# Patient Record
Sex: MALE
[De-identification: names, ages, dates, MRNs, and addresses within clinical notes are randomized per-mention and may not be internally consistent; named-entity substitution may affect disease eponyms.]

---

## 2017-07-16 NOTE — NUR
ADMISSION RN NOTE:

ADMITTED THIS 75 Y/O MALE. PT ADMITTED FROM [facility]. PT IS ON 5150 
FOR GD. PSYCH DX OF PSYCHOSIS. MEDICAL DX OF HTN, DM. PER HOLD PATIENT IS RAMBLING ABOUT 
ALLL THE MONEY THE GOVERNMENT OWES HIM AND STATED THAT HE IS BROKE. PT UNABLE TO FORMULATE 
WHERE HE HAS BEEN STAYING OR WHERE HE IS GOING TO STAY. RAPID, TANGENTIAL, DELUSIONAL SPEECH 
AND UNABLE TO PROVIDE ACCESS SHELTER FOR HIMSELF. UPON FACE TO FACE PT IS ALERT & ORIENTED 
X1-2, CONFUSED, DISORIENTED, DISORGANIZED. RESPIRATION EVEN, BREATHING AND UNLABORED. NO 
APPARENT DISTRESS NOTED. NO COMPLAIN OF PAIN AT THIS TIME. V/S WNL. MRSA DONE. SKIN BODY 
ASSESSMENT DONE. NOTED BUTTOCKS REDNESS, RASH. LEFT KNEE, BOTH HIP, BOTH ARM SCAB. BOTH FOOT 
REDNESS. LEFT AND RIGHT LOWER LEG SCAB. FOREHEAD REDNESS. WOUND CONSULT TRIGGERED. 
BELONGINGS INVENTORIED AND CHECKED FOR CONTRABAND. VALUABLES WERE PUT TO SAFE. BED LOCKED 
AND PLACED ON LOWEST POSITION. SIDERAILS UPX2. ALL NEEDS ATTENDED AND ANTICIPATED. WILL 
CONTINUE TO MONITOR Q15 MINS FOR SAFETY AND BEHAVIOR.

## 2017-07-18 NOTE — NUR
Initial discharge plan ;Pt is a resident at [facility] [id number] [phone] but states he doesn't have anywhere to go and wants to stay and 
be placed in [place]. SW will follow up with Md and facility and will arrange for safe 
and proper discharge.

## 2017-07-18 NOTE — NUR
Pt. was referred to to [facility] [address], [place] 
[address]  [phone]. 

-------------------------------------------------------------------------------

Addendum: 07/18/17 at 1521 by CHIVO MADSEN

-------------------------------------------------------------------------------

Per April from the facility pt. is accepted but they need to work on opening up a bed.

## 2017-07-18 NOTE — NUR
CALE spoke with Jian from [facility]  [address] [id number] [phone] who said pt. cannot be accepted back due to him leaving the facility again. CALE 
will look for a new placement.

## 2017-07-19 NOTE — NUR
Pt. was referred and accepted to [facility] [address], [place] [address] 
Phone: [phone] per Eva from admission. Dr. Tapia wanted this facility as pt. 
needs a long term facility.

## 2017-07-19 NOTE — NUR
WOUND CARE CONSULT: PT IS AMBULATORY AND CONTINENT. RESOLVING RASH NOTED TO LOWER BUTTOCKS. 
RECOMMEND Z GUARD AS NEEDED. SKIN TO BE KEPT CLEAN AND DRY. DISCUSSED WITH NURSING STAFF. 
WILL SEE PRN.

## 2017-07-20 NOTE — NUR
GPS RN NOTE,  RECEIVED PATIENT AWAKE AND IN BED, NO S/S OR COMPLAINTS OF PAIN AT THIS TIME.  
PATIENT IS DISPLAYING NO S/S OF APPARENT DISTRESS AT THIS TIME.   PATIENT BREATHING IS 
UNLABORED WITH EQUAL RISE AND FALL OF THE CHEST.  PATIENT IS ALERT AND ORIENTED X 2 ON ROOM 
AIR WITH A SPO2 95%.  PATIENT COMPLAINT WITH MEDICATION, ANXIOUS, COOPERATIVE, CALM, POLITE, 
AND NEEDS REORIENTATION.  PATIENT DENIES SUICIDE AND HOMICIDAL IDEATIONS AT THIS TIME.  
PATIENT ASSISTED WITH TURNING AND REPOSITIONING Q2HR AND PRN FOR COMFORT AND CIRCULATION.  
PATIENT HAS NO NEEDS AT THIS TIME.  PATIENT EDUCATED ON THE USE OF THE CALL BELL.  PATIENT 
BED SIDE RAILS UP X2 FOR SAFETY, BED IS LOCKED AND LOW WILL CONTINUE TO MONITOR AND MAINTAIN 
SAFETY.

## 2017-07-20 NOTE — NUR
GPS RN NOTE, PERFORMED ACCU-CHECK ON PATIENT WITH A BLOOD SUGAR RESULT .  GAVE 4 UNITS 
OF REGULAR INSULIN PER SLIDING SCALE.  WILL CONTINUE TO MONITOR THIS PATIENT.

## 2017-07-21 NOTE — NUR
Patient informed Jose from [facility] [address], [place] [address] 
Phone: [phone] Fax: [phone] that patient will be discharging there on Monday. SW 
faxed him vaccine information and will follow up with facility if discharge date changes. No 
family to inform. CALE will schedule [facility] transportation for patient.

## 2017-07-21 NOTE — NUR
GPS RN NOTE:

PATIENT REPORTED IMPROVED GENERALIZED PAIN. ALL NEEDS MET AND ANTICIPATED. WILL CONTINUE TO 
MONITOR.

## 2017-07-21 NOTE — NUR
GPS/RN NOTE:



ACCUCHECK 217 MG/DL, 4 UNITS REG. INSULIN SC ADMINISTERED, OFFERED HS SNACKS, REFUSED, SAID 
HE IS FULL.

## 2017-07-21 NOTE — NUR
GPS RN NOTE:

PATIENT BLOOD SUGAR 257 MG/DL. 6 UNITS OF INSULIN GIVEN PER SLIDING SCALE. WILL CONTINUE TO 
MONITOR.

## 2017-07-21 NOTE — NUR
GPS RN NOTE, PATIENT HAS A COMPLAINT OF LEFT KNEE PAIN AT 3 OUT 10 ON THE PAIN SCALE AND 
WOULD LIKE MEDICATION AT THIS TIME.  PATIENT VITAL SIGNS ARE STABLE.  GAVE TYLENOL 650MG PO 
Q6HR PRN.  WILL REASSESS PAIN AND I WILL CONTINUE TO MONITOR THIS PATIENT.

## 2017-07-21 NOTE — NUR
GPS RN NOTE:

RECEIVED PATIENT SITTING ON BED. PATIENT IS CALM, COOPERATIVE AND TALKATIVE. ALERT AND 
ORIENTED. DENIES PAIN. DENIES SI/HI. ALL NEEDS MET AND ANTICIPATED. WILL CONTINUE TO MONITOR 
FOR SAFETY AND BEHAVIOR.

## 2017-07-23 NOTE — NUR
GPS/RN NOTE:





AWAKE, AMBULATING AROUND THE UNIT TO THE DINING AREA AND BACK IN HIS ROOM. CALM, PLEASANT 
AND COOPERATIVE.

## 2017-07-23 NOTE — NUR
GPS-RN-NOTES:

BLOOD SUGAR  MG/DL AND GAVE 4 UNITS OF REGULAR INSULIN 

-------------------------------------------------------------------------------

Addendum: 07/23/17 at 1432 by DIEGO MINA RN

-------------------------------------------------------------------------------

INCORRECT INSULIN COVERAGE IS: GAVE 3 UNITS OF REGULAR INSULIN

## 2017-07-24 NOTE — NUR
GPS/RN

PATIENT IS SLEEPING AT THIS TIME, CALM AND COMFORTABLE, BREATHING EVEN AND UNLABORED, WILL 
CONTINUE TO MONITOR.

## 2017-07-25 NOTE — NUR
GPS-RN-NOTES:

PT IS 76 YEARS OLD MALE DISCHARGE TO [facility] [address] [place], [place]. 
[address] [phone] IN STABLE CONDITION. COMPLAINT WITH MEDICATIONS, COOPERATIVE WITH 
TREATMENT PLANS. PT DENIES SI/HI AND INSTRUCTED TO GO THE CLOSEST ER IF DEVELOPING SI/HI. 
BEHAVIOR IMPROVED, PSYCHIATRIC TX PLANS MET, MEDICAL TX PLANS DEFERRED FOR CONTINUAL 
MONITORING. EDUCATED PT ABOUT AFTER CARE PLAN AND COPY PROVIDED. RETURNED PERSONAL 
BELONGINGS TO PT. MEDICATIONS RECONCILED WITH DR. DOUGLAS AND DR. CONTEH. PT IS GOING TO 
BE FOLLOWED BY DR. DUOGLAS AT [address] [place]. [address] [phone]. REPORT GIVEN TO LASHAWN IN [facility] FOR CONTINUITY OF CARE. PT SIGNED 
DISCHARGE PAPERWORK. SKIN ASSESSMENT DONE. PT LEFT THE UNIT BY AMBULANCE STAFF [facility] [address]. [address], [facility], [place]. [address] [phone].

## 2017-07-25 NOTE — NUR
Discharge note:  Pt. will discharge to [facility] [address], [place] 
[address] Phone: [phone] via [facility] ambulance at 1:00PM.  No family to be notified . 
Pt. is alert and oriented and agrees with the discharge plan. Pt. denies suicidal/homicidal 
ideations, hallucinations. Pt. is calm and cooperative. Pt. will follow up with Dr. Tapia 
at the facility. Discharge continuing care form has been signed and discharge instructions 
(report) will be provided to the facility prior to transfer.

## 2017-07-25 NOTE — NUR
GPS/RN

STILL SLEEPING AT THIS TIME, COMFORTABLE, NO CHANGE IS CONDITION. ALL NEEDS ATTENDED AT THIS 
TIME. WILL CONTINUE TO MONITOR.

## 2020-08-21 ENCOUNTER — HOSPITAL ENCOUNTER (INPATIENT)
Dept: HOSPITAL 12 - ER | Age: 80
LOS: 11 days | Discharge: NURSING, REHAB OR LONG-TERM CARE | DRG: 885 | End: 2020-09-01
Payer: MEDICARE

## 2020-08-21 VITALS — WEIGHT: 232 LBS | HEIGHT: 69 IN | BODY MASS INDEX: 34.36 KG/M2

## 2020-08-21 DIAGNOSIS — F17.210: ICD-10-CM

## 2020-08-21 DIAGNOSIS — I10: ICD-10-CM

## 2020-08-21 DIAGNOSIS — D75.89: ICD-10-CM

## 2020-08-21 DIAGNOSIS — E66.9: ICD-10-CM

## 2020-08-21 DIAGNOSIS — D68.69: ICD-10-CM

## 2020-08-21 DIAGNOSIS — E44.0: ICD-10-CM

## 2020-08-21 DIAGNOSIS — E11.65: ICD-10-CM

## 2020-08-21 DIAGNOSIS — B35.1: ICD-10-CM

## 2020-08-21 DIAGNOSIS — F29: ICD-10-CM

## 2020-08-21 DIAGNOSIS — F03.90: ICD-10-CM

## 2020-08-21 DIAGNOSIS — Z79.84: ICD-10-CM

## 2020-08-21 DIAGNOSIS — L85.3: ICD-10-CM

## 2020-08-21 DIAGNOSIS — F25.0: Primary | ICD-10-CM

## 2020-08-21 DIAGNOSIS — K21.9: ICD-10-CM

## 2020-08-21 DIAGNOSIS — Z59.0: ICD-10-CM

## 2020-08-21 DIAGNOSIS — G89.29: ICD-10-CM

## 2020-08-21 DIAGNOSIS — M19.90: ICD-10-CM

## 2020-08-21 PROCEDURE — A4663 DIALYSIS BLOOD PRESSURE CUFF: HCPCS

## 2020-08-21 SDOH — ECONOMIC STABILITY - HOUSING INSECURITY: HOMELESSNESS: Z59.0

## 2020-08-21 NOTE — NUR
Patient BIB private ambuance from [facility] A/Ox3, 
ambulatory with assistance. No distress noted. Here for medical clearance to be 
eval by Crisis team.

## 2020-08-22 VITALS — SYSTOLIC BLOOD PRESSURE: 132 MMHG | DIASTOLIC BLOOD PRESSURE: 69 MMHG

## 2020-08-22 VITALS — SYSTOLIC BLOOD PRESSURE: 117 MMHG | DIASTOLIC BLOOD PRESSURE: 55 MMHG

## 2020-08-22 VITALS — DIASTOLIC BLOOD PRESSURE: 48 MMHG | SYSTOLIC BLOOD PRESSURE: 113 MMHG

## 2020-08-22 VITALS — DIASTOLIC BLOOD PRESSURE: 66 MMHG | SYSTOLIC BLOOD PRESSURE: 101 MMHG

## 2020-08-22 LAB
ALP SERPL-CCNC: 88 U/L (ref 50–136)
ALT SERPL W/O P-5'-P-CCNC: 32 U/L (ref 16–63)
AST SERPL-CCNC: 16 U/L (ref 15–37)
BASOPHILS # BLD AUTO: 0.1 K/UL (ref 0–8)
BASOPHILS NFR BLD AUTO: 1 % (ref 0–2)
BILIRUB SERPL-MCNC: 0.5 MG/DL (ref 0.2–1)
BUN SERPL-MCNC: 18 MG/DL (ref 7–18)
CHLORIDE SERPL-SCNC: 103 MMOL/L (ref 98–107)
CHOLEST SERPL-MCNC: 143 MG/DL (ref ?–200)
CO2 SERPL-SCNC: 22 MMOL/L (ref 21–32)
CREAT SERPL-MCNC: 1.2 MG/DL (ref 0.6–1.3)
EOSINOPHIL # BLD AUTO: 0.3 K/UL (ref 0–0.7)
EOSINOPHIL NFR BLD AUTO: 3.8 % (ref 0–7)
GLUCOSE SERPL-MCNC: 148 MG/DL (ref 74–106)
HCT VFR BLD AUTO: 37 % (ref 36.7–47.1)
HDLC SERPL-MCNC: 49 MG/DL (ref 40–60)
HGB BLD-MCNC: 12.7 G/DL (ref 12.5–16.3)
LYMPHOCYTES # BLD AUTO: 2 K/UL (ref 20–40)
LYMPHOCYTES NFR BLD AUTO: 23.3 % (ref 20.5–51.5)
MCH RBC QN AUTO: 33.9 UUG (ref 23.8–33.4)
MCHC RBC AUTO-ENTMCNC: 34 G/DL (ref 32.5–36.3)
MCV RBC AUTO: 99.2 FL (ref 73–96.2)
MONOCYTES # BLD AUTO: 0.6 K/UL (ref 2–10)
MONOCYTES NFR BLD AUTO: 6.8 % (ref 0–11)
NEUTROPHILS # BLD AUTO: 5.7 K/UL (ref 1.8–8.9)
NEUTROPHILS NFR BLD AUTO: 65.1 % (ref 38.5–71.5)
PLATELET # BLD AUTO: 334 K/UL (ref 152–348)
POTASSIUM SERPL-SCNC: 4.5 MMOL/L (ref 3.5–5.1)
RBC # BLD AUTO: 3.73 MIL/UL (ref 4.06–5.63)
TRIGL SERPL-MCNC: 104 MG/DL (ref 30–150)
WBC # BLD AUTO: 8.8 K/UL (ref 3.6–10.2)
WS STN SPEC: 7 G/DL (ref 6.4–8.2)

## 2020-08-22 RX ADMIN — OLANZAPINE SCH MG: 5 TABLET ORAL at 14:54

## 2020-08-22 RX ADMIN — OLANZAPINE SCH MG: 5 TABLET ORAL at 20:10

## 2020-08-22 RX ADMIN — SODIUM CHLORIDE PRN UNIT: 9 INJECTION, SOLUTION INTRAVENOUS at 17:14

## 2020-08-22 RX ADMIN — NICOTINE SCH MG: 21 PATCH, EXTENDED RELEASE TOPICAL at 08:50

## 2020-08-22 RX ADMIN — DIVALPROEX SODIUM SCH MG: 250 TABLET, DELAYED RELEASE ORAL at 17:13

## 2020-08-22 RX ADMIN — Medication SCH EACH: at 16:35

## 2020-08-22 RX ADMIN — SODIUM CHLORIDE PRN UNIT: 9 INJECTION, SOLUTION INTRAVENOUS at 20:11

## 2020-08-22 RX ADMIN — Medication SCH EACH: at 20:10

## 2020-08-22 NOTE — NUR
Admitting Note GPS/NSG

A 79 yr old  male sent to [facility] on a 5150 for Danger to self 
and Grave Disability Under the care of Dr. Pacheco psychiatrist and Dr. Sosa. Initially 
patient was sent to [facility] by a neurologist after patient displayed 
manic behavior and altered mental status. According to information provided by previous 
medical records as well as the hold.  Patient began to exhibit bizarre behavior and 
disorganized thoughts while at [facility]. Patient has history of Bipolar D/O, Schizophrenia, 
Anxiety. Patient is also a pack a day smoker for unknown number of years. Patient  Upon 
arrival patient appeared to be alert to time, place, and situation however upon further 
discussion pt was disorganized with rambled speech, patient was also giving tangental 
responses. Pt given Advisement and Patient Rights Handbook, as well as a verbal explanation 
of unit rules, patient was calm and cooperative during this process. Pt will require 
reinforcement and observation due to cognitive deficits. No record of family provided unable 
to make notification at this time. Plan of Care inititiated and will continue to monitor for 
Safety.  

-------------------------------------------------------------------------------

Addendum: 08/22/20 at 0545 by STEFANY TAYLOR RN

-------------------------------------------------------------------------------

Patient's hold seems to reflect assessment performed upon arrival to the unit. Patient 
appears to have poor insight into current situation and is unlikely to be able to give 
regard to safety.

## 2020-08-23 VITALS — SYSTOLIC BLOOD PRESSURE: 126 MMHG | DIASTOLIC BLOOD PRESSURE: 77 MMHG

## 2020-08-23 VITALS — DIASTOLIC BLOOD PRESSURE: 60 MMHG | SYSTOLIC BLOOD PRESSURE: 129 MMHG

## 2020-08-23 VITALS — DIASTOLIC BLOOD PRESSURE: 64 MMHG | SYSTOLIC BLOOD PRESSURE: 147 MMHG

## 2020-08-23 LAB
APPEARANCE UR: CLEAR
BILIRUB UR QL STRIP: NEGATIVE
COLOR UR: YELLOW
GLUCOSE UR STRIP-MCNC: NEGATIVE MG/DL
HGB UR QL STRIP: NEGATIVE
KETONES UR STRIP-MCNC: NEGATIVE MG/DL
LEUKOCYTE ESTERASE UR QL STRIP: NEGATIVE
NITRITE UR QL STRIP: NEGATIVE
PH UR STRIP: 5.5 [PH] (ref 5–8)
SP GR UR STRIP: 1.02 (ref 1–1.03)
UROBILINOGEN UR STRIP-MCNC: 0.2 E.U./DL

## 2020-08-23 RX ADMIN — NICOTINE SCH MG: 21 PATCH, EXTENDED RELEASE TOPICAL at 08:34

## 2020-08-23 RX ADMIN — Medication SCH EACH: at 20:42

## 2020-08-23 RX ADMIN — ACETAMINOPHEN PRN MG: 325 TABLET ORAL at 11:24

## 2020-08-23 RX ADMIN — ENOXAPARIN SODIUM SCH MG: 40 INJECTION SUBCUTANEOUS at 08:34

## 2020-08-23 RX ADMIN — Medication SCH EACH: at 06:33

## 2020-08-23 RX ADMIN — Medication SCH EACH: at 16:24

## 2020-08-23 RX ADMIN — SODIUM CHLORIDE PRN UNIT: 9 INJECTION, SOLUTION INTRAVENOUS at 16:51

## 2020-08-23 RX ADMIN — DIVALPROEX SODIUM SCH MG: 250 TABLET, DELAYED RELEASE ORAL at 08:34

## 2020-08-23 RX ADMIN — OLANZAPINE SCH MG: 5 TABLET ORAL at 20:01

## 2020-08-23 RX ADMIN — OLANZAPINE SCH MG: 5 TABLET ORAL at 08:34

## 2020-08-23 RX ADMIN — DIVALPROEX SODIUM SCH MG: 250 TABLET, DELAYED RELEASE ORAL at 16:52

## 2020-08-23 RX ADMIN — Medication SCH EACH: at 11:25

## 2020-08-23 NOTE — NUR
Received Pt in his room, A+Ox1 to himself only. Pt has no insight into current situation and 
could not explain where he was. Exhibits garbled speech, and is disorganized in thought 
process. Pt is tangential, jumps from one unrelated topic to another, and gives illogical 
answers to questions. HS . Compliant with all medications except insulin, education 
regarding risks and benefits provided. AM .  Denies pain, VS stable.

## 2020-08-24 VITALS — SYSTOLIC BLOOD PRESSURE: 111 MMHG | DIASTOLIC BLOOD PRESSURE: 66 MMHG

## 2020-08-24 VITALS — DIASTOLIC BLOOD PRESSURE: 64 MMHG | SYSTOLIC BLOOD PRESSURE: 136 MMHG

## 2020-08-24 VITALS — SYSTOLIC BLOOD PRESSURE: 144 MMHG | DIASTOLIC BLOOD PRESSURE: 66 MMHG

## 2020-08-24 RX ADMIN — Medication SCH EACH: at 20:59

## 2020-08-24 RX ADMIN — SODIUM CHLORIDE PRN UNIT: 9 INJECTION, SOLUTION INTRAVENOUS at 21:03

## 2020-08-24 RX ADMIN — Medication SCH EACH: at 06:36

## 2020-08-24 RX ADMIN — OLANZAPINE SCH MG: 5 TABLET ORAL at 20:59

## 2020-08-24 RX ADMIN — DIVALPROEX SODIUM SCH MG: 250 TABLET, DELAYED RELEASE ORAL at 08:32

## 2020-08-24 RX ADMIN — SODIUM CHLORIDE PRN UNIT: 9 INJECTION, SOLUTION INTRAVENOUS at 16:27

## 2020-08-24 RX ADMIN — Medication SCH EACH: at 16:22

## 2020-08-24 RX ADMIN — Medication SCH EACH: at 11:30

## 2020-08-24 RX ADMIN — DIVALPROEX SODIUM SCH MG: 250 TABLET, DELAYED RELEASE ORAL at 16:06

## 2020-08-24 RX ADMIN — ENOXAPARIN SODIUM SCH MG: 40 INJECTION SUBCUTANEOUS at 08:32

## 2020-08-24 RX ADMIN — NICOTINE SCH MG: 21 PATCH, EXTENDED RELEASE TOPICAL at 08:32

## 2020-08-24 RX ADMIN — OLANZAPINE SCH MG: 5 TABLET ORAL at 08:32

## 2020-08-24 RX ADMIN — SODIUM CHLORIDE PRN UNIT: 9 INJECTION, SOLUTION INTRAVENOUS at 08:59

## 2020-08-24 NOTE — NUR
PT SLEPT 5   HOURS. PT IN NO ACUTE DISTRESS.PT COMPLIANT WITH CARE. PT HAD EPISODES OF 
RAMBLING WHEN SPEAKING. PT NEEDS REORIENTATION . PT REDIRECTABLE. PRESCRIBED MEDICATION 
GIVEN AND PT TOLERATED IT WELL. SAFETY AND COMFORT PROVIDED. ALL NEEDS ARE MET. WILL ENDORSE 
TO INCOMING NURSE FOR CONTINUITY OF CARE.

## 2020-08-24 NOTE — NUR
Social Work Firearms Report (DOJ):

 completed and submitted a DOJ firearms report for 5150 grave disability 
certification. A copy of report has been placed in patient chart.

## 2020-08-24 NOTE — NUR
INITIAL DISCHARGE PLAN: Pt is homeless and will need SNF placement. SW will help form a safe 
and proper discharge in collaboration with MD.

## 2020-08-25 VITALS — SYSTOLIC BLOOD PRESSURE: 145 MMHG | DIASTOLIC BLOOD PRESSURE: 71 MMHG

## 2020-08-25 VITALS — DIASTOLIC BLOOD PRESSURE: 68 MMHG | SYSTOLIC BLOOD PRESSURE: 140 MMHG

## 2020-08-25 VITALS — DIASTOLIC BLOOD PRESSURE: 47 MMHG | SYSTOLIC BLOOD PRESSURE: 145 MMHG

## 2020-08-25 RX ADMIN — DIVALPROEX SODIUM SCH MG: 250 TABLET, DELAYED RELEASE ORAL at 20:18

## 2020-08-25 RX ADMIN — Medication SCH EACH: at 11:30

## 2020-08-25 RX ADMIN — ENOXAPARIN SODIUM SCH MG: 40 INJECTION SUBCUTANEOUS at 08:27

## 2020-08-25 RX ADMIN — DIVALPROEX SODIUM SCH MG: 250 TABLET, DELAYED RELEASE ORAL at 08:27

## 2020-08-25 RX ADMIN — Medication SCH EACH: at 06:46

## 2020-08-25 RX ADMIN — Medication SCH EACH: at 20:27

## 2020-08-25 RX ADMIN — ACETAMINOPHEN PRN MG: 325 TABLET ORAL at 00:33

## 2020-08-25 RX ADMIN — OLANZAPINE SCH MG: 5 TABLET ORAL at 08:27

## 2020-08-25 RX ADMIN — LORAZEPAM PRN MG: 0.5 TABLET ORAL at 00:33

## 2020-08-25 RX ADMIN — NICOTINE SCH MG: 21 PATCH, EXTENDED RELEASE TOPICAL at 08:28

## 2020-08-25 RX ADMIN — OLANZAPINE SCH MG: 5 TABLET ORAL at 20:18

## 2020-08-25 RX ADMIN — SODIUM CHLORIDE PRN UNIT: 9 INJECTION, SOLUTION INTRAVENOUS at 16:18

## 2020-08-25 RX ADMIN — Medication SCH EACH: at 16:17

## 2020-08-25 RX ADMIN — DIVALPROEX SODIUM SCH MG: 250 TABLET, DELAYED RELEASE ORAL at 16:51

## 2020-08-25 RX ADMIN — SODIUM CHLORIDE PRN UNIT: 9 INJECTION, SOLUTION INTRAVENOUS at 20:31

## 2020-08-25 NOTE — NUR
RECEIVED PATIENT IN HIS ROOM.NOTED WITH RAMBLING SPEECH AND DISORGANIZED THOUGHTS.LOOSE 
ASSOCIATIONS LIKE"RA ORTEGA', STRONG,PUSH PUSH".HAS NO INSIGHT AT ALL. HOWEVER COMPLIANT 
WITH MEDICATIONS AND CARE. BLOOD SUGAR CHECK  AND INSULIN COVERAGE PER SLIDING SCALE 
GIVEN. VISUAL CHECKS MADE ON HIM. WILL CONTINUE TO MONITOR.

## 2020-08-26 VITALS — SYSTOLIC BLOOD PRESSURE: 134 MMHG | DIASTOLIC BLOOD PRESSURE: 56 MMHG

## 2020-08-26 VITALS — SYSTOLIC BLOOD PRESSURE: 132 MMHG | DIASTOLIC BLOOD PRESSURE: 63 MMHG

## 2020-08-26 VITALS — DIASTOLIC BLOOD PRESSURE: 61 MMHG | SYSTOLIC BLOOD PRESSURE: 129 MMHG

## 2020-08-26 LAB
ALP SERPL-CCNC: 84 U/L (ref 50–136)
ALT SERPL W/O P-5'-P-CCNC: 28 U/L (ref 16–63)
AST SERPL-CCNC: 15 U/L (ref 15–37)
BASOPHILS # BLD AUTO: 0 K/UL (ref 0–8)
BASOPHILS NFR BLD AUTO: 0.3 % (ref 0–2)
BILIRUB SERPL-MCNC: 0.9 MG/DL (ref 0.2–1)
BUN SERPL-MCNC: 16 MG/DL (ref 7–18)
CHLORIDE SERPL-SCNC: 104 MMOL/L (ref 98–107)
CO2 SERPL-SCNC: 29 MMOL/L (ref 21–32)
CREAT SERPL-MCNC: 1.1 MG/DL (ref 0.6–1.3)
EOSINOPHIL # BLD AUTO: 0.3 K/UL (ref 0–0.7)
EOSINOPHIL NFR BLD AUTO: 2.9 % (ref 0–7)
GLUCOSE SERPL-MCNC: 145 MG/DL (ref 74–106)
HCT VFR BLD AUTO: 38.4 % (ref 36.7–47.1)
HGB BLD-MCNC: 13.3 G/DL (ref 12.5–16.3)
LYMPHOCYTES # BLD AUTO: 1.7 K/UL (ref 20–40)
LYMPHOCYTES NFR BLD AUTO: 18.4 % (ref 20.5–51.5)
MAGNESIUM SERPL-MCNC: 1.2 MG/DL (ref 1.8–2.4)
MCH RBC QN AUTO: 33.7 UUG (ref 23.8–33.4)
MCHC RBC AUTO-ENTMCNC: 35 G/DL (ref 32.5–36.3)
MCV RBC AUTO: 97.7 FL (ref 73–96.2)
MONOCYTES # BLD AUTO: 0.6 K/UL (ref 2–10)
MONOCYTES NFR BLD AUTO: 6.2 % (ref 0–11)
NEUTROPHILS # BLD AUTO: 6.6 K/UL (ref 1.8–8.9)
NEUTROPHILS NFR BLD AUTO: 72.2 % (ref 38.5–71.5)
PHOSPHATE SERPL-MCNC: 3.6 MG/DL (ref 2.5–4.9)
PLATELET # BLD AUTO: 206 K/UL (ref 152–348)
POTASSIUM SERPL-SCNC: 4.3 MMOL/L (ref 3.5–5.1)
RBC # BLD AUTO: 3.93 MIL/UL (ref 4.06–5.63)
TSH SERPL DL<=0.005 MIU/L-ACNC: 2.96 MIU/ML (ref 0.36–3.74)
WBC # BLD AUTO: 9.1 K/UL (ref 3.6–10.2)
WS STN SPEC: 7.5 G/DL (ref 6.4–8.2)

## 2020-08-26 PROCEDURE — 0HBRXZZ EXCISION OF TOE NAIL, EXTERNAL APPROACH: ICD-10-PCS

## 2020-08-26 RX ADMIN — DIVALPROEX SODIUM SCH MG: 250 TABLET, DELAYED RELEASE ORAL at 20:20

## 2020-08-26 RX ADMIN — DIVALPROEX SODIUM SCH MG: 250 TABLET, DELAYED RELEASE ORAL at 08:26

## 2020-08-26 RX ADMIN — Medication SCH EACH: at 06:49

## 2020-08-26 RX ADMIN — OLANZAPINE SCH MG: 5 TABLET ORAL at 08:26

## 2020-08-26 RX ADMIN — Medication SCH MG: at 10:17

## 2020-08-26 RX ADMIN — DIVALPROEX SODIUM SCH MG: 250 TABLET, DELAYED RELEASE ORAL at 17:22

## 2020-08-26 RX ADMIN — CYANOCOBALAMIN SCH MCG: 1000 INJECTION, SOLUTION INTRAMUSCULAR at 10:17

## 2020-08-26 RX ADMIN — GLIMEPIRIDE SCH MG: 2 TABLET ORAL at 08:35

## 2020-08-26 RX ADMIN — Medication SCH MG: at 20:20

## 2020-08-26 RX ADMIN — ACETAMINOPHEN PRN MG: 325 TABLET ORAL at 08:26

## 2020-08-26 RX ADMIN — OLANZAPINE SCH MG: 5 TABLET ORAL at 20:20

## 2020-08-26 NOTE — NUR
RECEIVED PATIENT IN HIS ROOM SITTING IN A CHAIR. PATIENT NOTED A/ O X 2. CALM AND PLEASANT 
UPON APPROACHED. POOR INSIGHT AND JUDGMENT IS NOTED AS TO THE REASON FOR HIS ADMISSION TO 
[facility]. PATIENT DENIED SI/HI/VI/AH. V/S STABLE. HE IS REASSURED FOR HIS SAFETY. SAFETY AND FALL 
PRECAUTION IN PLACE. WILL CONTINUE TO MONITOR.

## 2020-08-26 NOTE — NUR
SNF REFERRAL: JUAN M faxed SNF referral to [facility] ([facility]) [address] [place] [address] P: [phone] for review.

## 2020-08-27 VITALS — DIASTOLIC BLOOD PRESSURE: 67 MMHG | SYSTOLIC BLOOD PRESSURE: 153 MMHG

## 2020-08-27 VITALS — SYSTOLIC BLOOD PRESSURE: 127 MMHG | DIASTOLIC BLOOD PRESSURE: 62 MMHG

## 2020-08-27 VITALS — DIASTOLIC BLOOD PRESSURE: 78 MMHG | SYSTOLIC BLOOD PRESSURE: 146 MMHG

## 2020-08-27 RX ADMIN — OLANZAPINE SCH MG: 5 TABLET ORAL at 08:16

## 2020-08-27 RX ADMIN — CYANOCOBALAMIN SCH MCG: 1000 INJECTION, SOLUTION INTRAMUSCULAR at 08:15

## 2020-08-27 RX ADMIN — Medication SCH MG: at 08:16

## 2020-08-27 RX ADMIN — GLIMEPIRIDE SCH MG: 2 TABLET ORAL at 08:15

## 2020-08-27 RX ADMIN — DIVALPROEX SODIUM SCH MG: 250 TABLET, DELAYED RELEASE ORAL at 16:31

## 2020-08-27 RX ADMIN — DIVALPROEX SODIUM SCH MG: 250 TABLET, DELAYED RELEASE ORAL at 08:15

## 2020-08-27 RX ADMIN — OLANZAPINE SCH MG: 5 TABLET ORAL at 20:20

## 2020-08-27 RX ADMIN — DIVALPROEX SODIUM SCH MG: 250 TABLET, DELAYED RELEASE ORAL at 20:20

## 2020-08-27 RX ADMIN — Medication SCH MG: at 20:20

## 2020-08-27 NOTE — NUR
Received patient awake and verbally responsive. No signs of distress noted. Afebrile. No 
complain of pain or disocmfort. No SI/HI noted. compliant with all his medications. Safety 
Measures provided. Will

continue to monitor.

## 2020-08-27 NOTE — NUR
SNF CONTACT: SW received a call from Tere, admissions coordinator at [facility] ([facility]) [address] [place] [address] P: [phone] stating 
that pt was accepted to the facility.

## 2020-08-27 NOTE — NUR
patient slept for approx. 6.30 hrs through the night. Pt is calm and pleasant at this time. 
he shower this AM. continue to monitor.

## 2020-08-28 VITALS — DIASTOLIC BLOOD PRESSURE: 77 MMHG | SYSTOLIC BLOOD PRESSURE: 131 MMHG

## 2020-08-28 VITALS — SYSTOLIC BLOOD PRESSURE: 110 MMHG | DIASTOLIC BLOOD PRESSURE: 64 MMHG

## 2020-08-28 VITALS — SYSTOLIC BLOOD PRESSURE: 123 MMHG | DIASTOLIC BLOOD PRESSURE: 82 MMHG

## 2020-08-28 RX ADMIN — CYANOCOBALAMIN SCH MCG: 1000 INJECTION, SOLUTION INTRAMUSCULAR at 08:08

## 2020-08-28 RX ADMIN — GLIMEPIRIDE SCH MG: 2 TABLET ORAL at 08:08

## 2020-08-28 RX ADMIN — DIVALPROEX SODIUM SCH MG: 250 TABLET, DELAYED RELEASE ORAL at 08:07

## 2020-08-28 RX ADMIN — CYANOCOBALAMIN SCH MCG: 1000 INJECTION, SOLUTION INTRAMUSCULAR at 08:22

## 2020-08-28 RX ADMIN — Medication SCH MG: at 08:09

## 2020-08-28 RX ADMIN — DIVALPROEX SODIUM SCH MG: 250 TABLET, DELAYED RELEASE ORAL at 16:01

## 2020-08-28 RX ADMIN — OLANZAPINE SCH MG: 5 TABLET ORAL at 08:07

## 2020-08-28 RX ADMIN — Medication SCH MG: at 08:08

## 2020-08-28 RX ADMIN — Medication SCH MG: at 21:04

## 2020-08-28 RX ADMIN — DIVALPROEX SODIUM SCH MG: 250 TABLET, DELAYED RELEASE ORAL at 21:04

## 2020-08-28 RX ADMIN — OLANZAPINE SCH MG: 5 TABLET ORAL at 21:04

## 2020-08-28 NOTE — NUR
no changes noted during shift, patient denied any suicidal thoughts, no aggressive or 
combative behavior noted

## 2020-08-29 VITALS — SYSTOLIC BLOOD PRESSURE: 111 MMHG | DIASTOLIC BLOOD PRESSURE: 65 MMHG

## 2020-08-29 VITALS — DIASTOLIC BLOOD PRESSURE: 70 MMHG | SYSTOLIC BLOOD PRESSURE: 130 MMHG

## 2020-08-29 VITALS — DIASTOLIC BLOOD PRESSURE: 73 MMHG | SYSTOLIC BLOOD PRESSURE: 148 MMHG

## 2020-08-29 RX ADMIN — DIVALPROEX SODIUM SCH MG: 500 TABLET, DELAYED RELEASE ORAL at 20:04

## 2020-08-29 RX ADMIN — GLIMEPIRIDE SCH MG: 2 TABLET ORAL at 08:33

## 2020-08-29 RX ADMIN — CLOTRIMAZOLE SCH GM: 1 CREAM TOPICAL at 17:05

## 2020-08-29 RX ADMIN — ACETAMINOPHEN PRN MG: 325 TABLET ORAL at 13:10

## 2020-08-29 RX ADMIN — Medication SCH MG: at 08:30

## 2020-08-29 RX ADMIN — DIVALPROEX SODIUM SCH MG: 250 TABLET, DELAYED RELEASE ORAL at 08:30

## 2020-08-29 RX ADMIN — LORAZEPAM PRN MG: 0.5 TABLET ORAL at 13:10

## 2020-08-29 RX ADMIN — DIVALPROEX SODIUM SCH MG: 250 TABLET, DELAYED RELEASE ORAL at 17:04

## 2020-08-29 RX ADMIN — OLANZAPINE SCH MG: 5 TABLET ORAL at 08:30

## 2020-08-29 RX ADMIN — Medication SCH MG: at 20:04

## 2020-08-29 RX ADMIN — Medication SCH MG: at 08:33

## 2020-08-29 RX ADMIN — CLOTRIMAZOLE SCH GM: 1 CREAM TOPICAL at 08:32

## 2020-08-29 RX ADMIN — OLANZAPINE SCH MG: 5 TABLET ORAL at 20:04

## 2020-08-29 NOTE — NUR
GPS: Pt noted in room awake and saying a lot but unable to speak clearly. No s/s of acute 
distress, and sob noted. Not cognitively able to relate with SI or intent. Cooperative with 
routine medications. Pt asked for something to sleep but after medication was pull up he 
said no I am fine. No excessive behavior noted, on continue q/15mins head count at this 
time.

## 2020-08-30 VITALS — SYSTOLIC BLOOD PRESSURE: 107 MMHG | DIASTOLIC BLOOD PRESSURE: 50 MMHG

## 2020-08-30 VITALS — DIASTOLIC BLOOD PRESSURE: 62 MMHG | SYSTOLIC BLOOD PRESSURE: 139 MMHG

## 2020-08-30 VITALS — SYSTOLIC BLOOD PRESSURE: 110 MMHG | DIASTOLIC BLOOD PRESSURE: 75 MMHG

## 2020-08-30 RX ADMIN — Medication SCH MG: at 08:06

## 2020-08-30 RX ADMIN — Medication SCH MG: at 20:08

## 2020-08-30 RX ADMIN — DIVALPROEX SODIUM SCH MG: 500 TABLET, DELAYED RELEASE ORAL at 20:08

## 2020-08-30 RX ADMIN — OLANZAPINE SCH MG: 5 TABLET ORAL at 20:08

## 2020-08-30 RX ADMIN — DIVALPROEX SODIUM SCH MG: 250 TABLET, DELAYED RELEASE ORAL at 08:05

## 2020-08-30 RX ADMIN — DIVALPROEX SODIUM SCH MG: 250 TABLET, DELAYED RELEASE ORAL at 17:26

## 2020-08-30 RX ADMIN — GLIMEPIRIDE SCH MG: 2 TABLET ORAL at 08:05

## 2020-08-30 RX ADMIN — CLOTRIMAZOLE SCH GM: 1 CREAM TOPICAL at 08:06

## 2020-08-30 RX ADMIN — CLOTRIMAZOLE SCH GM: 1 CREAM TOPICAL at 17:27

## 2020-08-30 RX ADMIN — OLANZAPINE SCH MG: 5 TABLET ORAL at 08:06

## 2020-08-30 NOTE — NUR
Received Pt in his room, A+Ox1 to himself only, presents as confused and disoriented. Pt has 
no insight into current situation and could not explain where he was. Exhibits rapid, 
garbled, and pressured speech. Pt is scattered and disorganized in thought process. Pt is 
tangential, jumping from one unrelated topic to another, and gives illogical answers to 
questions. Compliant with all medications. Denies pain, VS stable. Shower given this 
morning.

## 2020-08-31 VITALS — SYSTOLIC BLOOD PRESSURE: 133 MMHG | DIASTOLIC BLOOD PRESSURE: 46 MMHG

## 2020-08-31 VITALS — SYSTOLIC BLOOD PRESSURE: 137 MMHG | DIASTOLIC BLOOD PRESSURE: 51 MMHG

## 2020-08-31 VITALS — DIASTOLIC BLOOD PRESSURE: 82 MMHG | SYSTOLIC BLOOD PRESSURE: 155 MMHG

## 2020-08-31 RX ADMIN — CLOTRIMAZOLE SCH GM: 1 CREAM TOPICAL at 08:39

## 2020-08-31 RX ADMIN — OLANZAPINE SCH MG: 5 TABLET ORAL at 20:15

## 2020-08-31 RX ADMIN — Medication SCH MG: at 20:15

## 2020-08-31 RX ADMIN — OLANZAPINE SCH MG: 5 TABLET ORAL at 08:38

## 2020-08-31 RX ADMIN — DIVALPROEX SODIUM SCH MG: 250 TABLET, DELAYED RELEASE ORAL at 17:42

## 2020-08-31 RX ADMIN — Medication SCH MG: at 08:38

## 2020-08-31 RX ADMIN — CLOTRIMAZOLE SCH GM: 1 CREAM TOPICAL at 17:42

## 2020-08-31 RX ADMIN — Medication SCH MG: at 08:39

## 2020-08-31 RX ADMIN — GLIMEPIRIDE SCH MG: 2 TABLET ORAL at 08:38

## 2020-08-31 RX ADMIN — DIVALPROEX SODIUM SCH MG: 250 TABLET, DELAYED RELEASE ORAL at 08:38

## 2020-08-31 RX ADMIN — DIVALPROEX SODIUM SCH MG: 500 TABLET, DELAYED RELEASE ORAL at 20:16

## 2020-08-31 NOTE — NUR
Received Pt in his room, A+Ox1 to himself only. Remains with poor insight. Exhibits rapid, 
garbled speech, and is disorganized in thought process. Compliant with all medications. 
Consumed 100% of snack. Denies pain, VS stable

## 2020-08-31 NOTE — NUR
WOUND CARE CONSULT: PT PRESENTS WITH LEFT ARM DISCOLORED AREA WITH PEELING SKIN. PT IS BEING 
TREATED WITH LOTRIMIN CREAM BY MD. WILL SEE PRN.

## 2020-09-01 VITALS — DIASTOLIC BLOOD PRESSURE: 72 MMHG | SYSTOLIC BLOOD PRESSURE: 146 MMHG

## 2020-09-01 RX ADMIN — Medication SCH MG: at 08:16

## 2020-09-01 RX ADMIN — DIVALPROEX SODIUM SCH MG: 250 TABLET, DELAYED RELEASE ORAL at 08:12

## 2020-09-01 RX ADMIN — CLOTRIMAZOLE SCH GM: 1 CREAM TOPICAL at 08:13

## 2020-09-01 RX ADMIN — Medication SCH MG: at 08:12

## 2020-09-01 RX ADMIN — GLIMEPIRIDE SCH MG: 2 TABLET ORAL at 08:12

## 2020-09-01 RX ADMIN — OLANZAPINE SCH MG: 5 TABLET ORAL at 08:16

## 2020-09-01 NOTE — NUR
Discharge Note:

Patient will be discharged to skilled nursing [facility], [facility] [address] [place] [address] ([phone]) via Ambulance transportation at 11:00am today. Social 
Worker spoke with Tere, Admissions Coordinator at [facility] ([phone]), who 
confirmed that patient will be accepted at their facility today. Patient does not have any 
family or next of kin contacts at this time. Patient is alert and oriented x3. Patient is 
not able to plan for self-care at this time but is willing to accept care provided for him 
at the facility. Patient denies suicidal or homicidal ideation. Patient is aware and 
agreeable with discharge plans. Patient presents with appropriate mood and congruent affect. 
Patient will follow-up with Psychiatrist Dr. Pacheco and Internist Dr. Rousseau at [facility]. Patient refused to sign the homeless waiver upon discharge, however it was cosigned, 
and a copy was placed in the chart. Homeless resources were provided and include 211 
information line for shelters and homeless resources. A copy of all resources given to 
patient was also placed in the chart.

## 2020-09-01 NOTE — NUR
Patient will be discharged to skilled nursing facility, [facility] via Ambulance 
transportation at 1pm  today. all personal belonging return to patient ,report called in to 
RN from SNF facility .vital sin stable.

## 2021-06-28 ENCOUNTER — HOSPITAL ENCOUNTER (INPATIENT)
Dept: HOSPITAL 54 - ER | Age: 81
LOS: 10 days | Discharge: NURSING, REHAB OR LONG-TERM CARE | DRG: 885 | End: 2021-07-08
Attending: PSYCHIATRY & NEUROLOGY | Admitting: PSYCHIATRY & NEUROLOGY
Payer: MEDICARE

## 2021-06-28 VITALS — HEIGHT: 70 IN | BODY MASS INDEX: 34.22 KG/M2 | WEIGHT: 239 LBS

## 2021-06-28 DIAGNOSIS — F29: ICD-10-CM

## 2021-06-28 DIAGNOSIS — F25.9: Primary | ICD-10-CM

## 2021-06-28 DIAGNOSIS — M19.90: ICD-10-CM

## 2021-06-28 DIAGNOSIS — E11.65: ICD-10-CM

## 2021-06-28 DIAGNOSIS — G89.29: ICD-10-CM

## 2021-06-28 DIAGNOSIS — E78.5: ICD-10-CM

## 2021-06-28 DIAGNOSIS — Z91.19: ICD-10-CM

## 2021-06-28 DIAGNOSIS — Z79.84: ICD-10-CM

## 2021-06-28 DIAGNOSIS — Z98.890: ICD-10-CM

## 2021-06-28 DIAGNOSIS — Z79.02: ICD-10-CM

## 2021-06-28 DIAGNOSIS — G40.909: ICD-10-CM

## 2021-06-28 DIAGNOSIS — Z79.899: ICD-10-CM

## 2021-06-28 DIAGNOSIS — I25.10: ICD-10-CM

## 2021-06-28 DIAGNOSIS — F31.9: ICD-10-CM

## 2021-06-28 DIAGNOSIS — E22.2: ICD-10-CM

## 2021-06-28 DIAGNOSIS — E86.1: ICD-10-CM

## 2021-06-28 DIAGNOSIS — Z79.82: ICD-10-CM

## 2021-06-28 DIAGNOSIS — I10: ICD-10-CM

## 2021-06-28 LAB
ALBUMIN SERPL BCP-MCNC: 3.4 G/DL (ref 3.4–5)
ALP SERPL-CCNC: 88 U/L (ref 46–116)
ALT SERPL W P-5'-P-CCNC: 41 U/L (ref 12–78)
APAP SERPL-MCNC: < 0 UG/ML (ref 10–30)
AST SERPL W P-5'-P-CCNC: 28 U/L (ref 15–37)
BASOPHILS # BLD AUTO: 0 K/UL (ref 0–0.2)
BASOPHILS NFR BLD AUTO: 0.5 % (ref 0–2)
BILIRUB DIRECT SERPL-MCNC: 0.2 MG/DL (ref 0–0.2)
BILIRUB SERPL-MCNC: 0.9 MG/DL (ref 0.2–1)
BUN SERPL-MCNC: 18 MG/DL (ref 7–18)
CALCIUM SERPL-MCNC: 9.4 MG/DL (ref 8.5–10.1)
CHLORIDE SERPL-SCNC: 96 MMOL/L (ref 98–107)
CO2 SERPL-SCNC: 27 MMOL/L (ref 21–32)
COLOR UR: YELLOW
CREAT SERPL-MCNC: 1 MG/DL (ref 0.6–1.3)
EOSINOPHIL NFR BLD AUTO: 1.1 % (ref 0–6)
ETHANOL SERPL-MCNC: < 3 MG/DL (ref 0–0)
GLUCOSE SERPL-MCNC: 320 MG/DL (ref 74–106)
GLUCOSE UR STRIP-MCNC: >=1000 MG/DL
HCT VFR BLD AUTO: 48 % (ref 39–51)
HGB BLD-MCNC: 16.6 G/DL (ref 13.5–17.5)
LYMPHOCYTES NFR BLD AUTO: 2.4 K/UL (ref 0.8–4.8)
LYMPHOCYTES NFR BLD AUTO: 28.8 % (ref 20–44)
MCHC RBC AUTO-ENTMCNC: 34 G/DL (ref 31–36)
MCV RBC AUTO: 98 FL (ref 80–96)
MONOCYTES NFR BLD AUTO: 0.7 K/UL (ref 0.1–1.3)
MONOCYTES NFR BLD AUTO: 8 % (ref 2–12)
NEUTROPHILS # BLD AUTO: 5.2 K/UL (ref 1.8–8.9)
NEUTROPHILS NFR BLD AUTO: 61.6 % (ref 43–81)
PH UR STRIP: 5.5 [PH] (ref 5–8)
PLATELET # BLD AUTO: 248 K/UL (ref 150–450)
POTASSIUM SERPL-SCNC: 4.3 MMOL/L (ref 3.5–5.1)
PROT SERPL-MCNC: 8.2 G/DL (ref 6.4–8.2)
PROT UR QL STRIP: >=300 MG/DL
RBC # BLD AUTO: 4.95 MIL/UL (ref 4.5–6)
RBC #/AREA URNS HPF: (no result) /HPF (ref 0–2)
SODIUM SERPL-SCNC: 131 MMOL/L (ref 136–145)
UROBILINOGEN UR STRIP-MCNC: 0.2 EU/DL
WBC #/AREA URNS HPF: (no result) /HPF (ref 0–3)
WBC NRBC COR # BLD AUTO: 8.5 K/UL (ref 4.3–11)

## 2021-06-28 PROCEDURE — G0480 DRUG TEST DEF 1-7 CLASSES: HCPCS

## 2021-06-28 PROCEDURE — C9803 HOPD COVID-19 SPEC COLLECT: HCPCS

## 2021-06-28 RX ADMIN — MAGNESIUM OXIDE TAB 400 MG (241.3 MG ELEMENTAL MG) SCH MG: 400 (241.3 MG) TAB at 23:45

## 2021-06-28 RX ADMIN — INSULIN HUMAN PRN UNITS: 100 INJECTION, SOLUTION PARENTERAL at 23:35

## 2021-06-28 RX ADMIN — Medication SCH EACH: at 23:22

## 2021-06-28 NOTE — NUR
PER NURSING SUPERVISOR, PATIENT WILL STAY IN THE ER, BECAUSE THERE'S NOT ENOUGH 
NURSES IN GEROPSYCH.

## 2021-06-28 NOTE — NUR
-------------------------------------------------------------------------------

          *** Note lita in EDM - 06/28/21 at 1836 by SUSI ***          

-------------------------------------------------------------------------------

MONICAB XRAY RESULT RECEIVED. PO CONTRAST ADMINISTERED VIA NGT. TOLERATED WLL BY 
THE PATIENT. WILL LET RADIOLOGY KNOW 1.5 HRS LATER

## 2021-06-28 NOTE — NUR
[facility] SNF, FOR INCREASE AGGRESSIVENESS BY HITTING STAFF. PATIENT A/OX4, 
BREATHING EVEN AND UNLABORED, NO SOB NOTED, NEEDS ATTENDED.

## 2021-06-29 VITALS — DIASTOLIC BLOOD PRESSURE: 92 MMHG | SYSTOLIC BLOOD PRESSURE: 140 MMHG

## 2021-06-29 VITALS — SYSTOLIC BLOOD PRESSURE: 156 MMHG | DIASTOLIC BLOOD PRESSURE: 87 MMHG

## 2021-06-29 LAB
BUN SERPL-MCNC: 18 MG/DL (ref 7–18)
CALCIUM SERPL-MCNC: 9.2 MG/DL (ref 8.5–10.1)
CHLORIDE SERPL-SCNC: 96 MMOL/L (ref 98–107)
CHOLEST SERPL-MCNC: 191 MG/DL (ref ?–200)
CO2 SERPL-SCNC: 24 MMOL/L (ref 21–32)
CREAT SERPL-MCNC: 1.1 MG/DL (ref 0.6–1.3)
GLUCOSE SERPL-MCNC: 264 MG/DL (ref 74–106)
HDLC SERPL-MCNC: 42 MG/DL (ref 40–60)
LDLC SERPL DIRECT ASSAY-MCNC: 127 MG/DL (ref 0–99)
POTASSIUM SERPL-SCNC: 4 MMOL/L (ref 3.5–5.1)
SODIUM SERPL-SCNC: 132 MMOL/L (ref 136–145)
TRIGL SERPL-MCNC: 145 MG/DL (ref 30–150)

## 2021-06-29 RX ADMIN — OLANZAPINE SCH MG: 5 TABLET, FILM COATED ORAL at 21:42

## 2021-06-29 RX ADMIN — Medication SCH EACH: at 17:26

## 2021-06-29 RX ADMIN — INSULIN HUMAN PRN UNITS: 100 INJECTION, SOLUTION PARENTERAL at 21:50

## 2021-06-29 RX ADMIN — TEMAZEPAM PRN MG: 7.5 CAPSULE ORAL at 21:42

## 2021-06-29 RX ADMIN — INSULIN HUMAN PRN UNITS: 100 INJECTION, SOLUTION PARENTERAL at 17:28

## 2021-06-29 RX ADMIN — ASPIRIN 81 MG SCH MG: 81 TABLET ORAL at 10:44

## 2021-06-29 RX ADMIN — OXYCODONE HYDROCHLORIDE AND ACETAMINOPHEN SCH MG: 500 TABLET ORAL at 10:44

## 2021-06-29 RX ADMIN — INSULIN HUMAN PRN UNITS: 100 INJECTION, SOLUTION PARENTERAL at 11:34

## 2021-06-29 RX ADMIN — MAGNESIUM OXIDE TAB 400 MG (241.3 MG ELEMENTAL MG) SCH MG: 400 (241.3 MG) TAB at 21:42

## 2021-06-29 RX ADMIN — Medication SCH EACH: at 10:41

## 2021-06-29 RX ADMIN — VITAMIN D, TAB 1000IU (100/BT) SCH UNIT: 25 TAB at 12:46

## 2021-06-29 RX ADMIN — OLANZAPINE SCH MG: 10 TABLET ORAL at 21:45

## 2021-06-29 RX ADMIN — DIVALPROEX SODIUM SCH MG: 250 TABLET, DELAYED RELEASE ORAL at 16:16

## 2021-06-29 RX ADMIN — Medication SCH EACH: at 11:32

## 2021-06-29 RX ADMIN — OXYCODONE HYDROCHLORIDE AND ACETAMINOPHEN SCH MG: 500 TABLET ORAL at 16:16

## 2021-06-29 RX ADMIN — OLANZAPINE SCH MG: 5 TABLET, FILM COATED ORAL at 15:23

## 2021-06-29 RX ADMIN — Medication SCH EACH: at 21:41

## 2021-06-29 RX ADMIN — VITAMIN D, TAB 1000IU (100/BT) SCH UNIT: 25 TAB at 10:44

## 2021-06-29 RX ADMIN — VITAMIN D, TAB 1000IU (100/BT) SCH UNIT: 25 TAB at 16:16

## 2021-06-29 RX ADMIN — Medication SCH MG: at 10:44

## 2021-06-29 NOTE — NUR
nurses opening notes:

Pt is in his room awake in bed when received from the morning shift, alert and awake with no 
distress as noted.

## 2021-06-29 NOTE — NUR
Initial Discharge Plan: Pt currently lives at [facility] located at [address] [place] [id number]; [phone]). Per pt, he would like to return to his home after 
discharge. SW will work with the pt and the MD regarding appropriate discharge planning. SW 
will form a safe and proper discharge.

## 2021-06-29 NOTE — NUR
RN NOTE- ADMISSION NOTE- 79 Y/O MALE W SCHIZOPHRENIA BROUGHT IN FROM ED FOR AGGRESSION AT 
FACILITY WHERE HE RESIDES. PT HAS PMHX- PALSY RT EYE, HTN, DM, WEAKNESS, . ON FACE TO FACE 
ASSESSMENT, PT IS ALERT CONFUSED W RAMBLING SPEECH. HE IS CALM DIRECTABLE AND COOPERATIVE. 
HE HAS NKA. VS- BP- 153/100, HR- 103, RR- 18. T- 97.8, SATS- 98% RA. HE IS 5' 3"  
POUNDS. PT PARANOID AND MUMBLING TO SELF. PT DENIES SI HI AH VH BUT IS RESPONDING TO 
INTERNAL STIMULUS. ACCU CHECK BS- 278. SKIN INTACT, PT W GENERAL WEAKNESS AND MALAISE. FULL 
ASSIST STAND BY ON AMBULATION. ORIENTED TO UNIT. MD NOTIFIED OF ADMISSION,. ORDERS RECEIVED. 
MRSA SWAB COMPLETED.

## 2021-06-29 NOTE — NUR
Point of Contact: SW contacted [facility] and spoke with admissions ([phone]) and 
discussed pts potential discharge plan. Admissions stated the pt can return to [facility] 
upon discharge.

## 2021-06-30 VITALS — DIASTOLIC BLOOD PRESSURE: 81 MMHG | SYSTOLIC BLOOD PRESSURE: 147 MMHG

## 2021-06-30 VITALS — DIASTOLIC BLOOD PRESSURE: 80 MMHG | SYSTOLIC BLOOD PRESSURE: 146 MMHG

## 2021-06-30 VITALS — SYSTOLIC BLOOD PRESSURE: 138 MMHG | DIASTOLIC BLOOD PRESSURE: 75 MMHG

## 2021-06-30 LAB
ALBUMIN SERPL BCP-MCNC: 3.2 G/DL (ref 3.4–5)
ALP SERPL-CCNC: 73 U/L (ref 46–116)
ALT SERPL W P-5'-P-CCNC: 36 U/L (ref 12–78)
AST SERPL W P-5'-P-CCNC: 23 U/L (ref 15–37)
BILIRUB SERPL-MCNC: 1.1 MG/DL (ref 0.2–1)
BUN SERPL-MCNC: 17 MG/DL (ref 7–18)
CALCIUM SERPL-MCNC: 9 MG/DL (ref 8.5–10.1)
CHLORIDE SERPL-SCNC: 100 MMOL/L (ref 98–107)
CO2 SERPL-SCNC: 28 MMOL/L (ref 21–32)
CREAT SERPL-MCNC: 0.9 MG/DL (ref 0.6–1.3)
GLUCOSE SERPL-MCNC: 192 MG/DL (ref 74–106)
POTASSIUM SERPL-SCNC: 4 MMOL/L (ref 3.5–5.1)
PROT SERPL-MCNC: 7.6 G/DL (ref 6.4–8.2)
SODIUM SERPL-SCNC: 134 MMOL/L (ref 136–145)

## 2021-06-30 RX ADMIN — VITAMIN D, TAB 1000IU (100/BT) SCH UNIT: 25 TAB at 12:03

## 2021-06-30 RX ADMIN — OXYCODONE HYDROCHLORIDE AND ACETAMINOPHEN SCH MG: 500 TABLET ORAL at 08:20

## 2021-06-30 RX ADMIN — Medication SCH EACH: at 11:40

## 2021-06-30 RX ADMIN — DIVALPROEX SODIUM SCH MG: 250 TABLET, DELAYED RELEASE ORAL at 08:20

## 2021-06-30 RX ADMIN — VITAMIN D, TAB 1000IU (100/BT) SCH UNIT: 25 TAB at 08:20

## 2021-06-30 RX ADMIN — MAGNESIUM OXIDE TAB 400 MG (241.3 MG ELEMENTAL MG) SCH MG: 400 (241.3 MG) TAB at 21:18

## 2021-06-30 RX ADMIN — INSULIN HUMAN PRN UNITS: 100 INJECTION, SOLUTION PARENTERAL at 11:44

## 2021-06-30 RX ADMIN — INSULIN HUMAN PRN UNITS: 100 INJECTION, SOLUTION PARENTERAL at 21:26

## 2021-06-30 RX ADMIN — OXYCODONE HYDROCHLORIDE AND ACETAMINOPHEN SCH MG: 500 TABLET ORAL at 16:23

## 2021-06-30 RX ADMIN — ASPIRIN 81 MG SCH MG: 81 TABLET ORAL at 08:20

## 2021-06-30 RX ADMIN — Medication SCH EACH: at 21:18

## 2021-06-30 RX ADMIN — Medication SCH EACH: at 17:16

## 2021-06-30 RX ADMIN — Medication SCH MG: at 08:20

## 2021-06-30 RX ADMIN — OLANZAPINE SCH MG: 5 TABLET, FILM COATED ORAL at 08:33

## 2021-06-30 RX ADMIN — INSULIN HUMAN PRN UNITS: 100 INJECTION, SOLUTION PARENTERAL at 07:22

## 2021-06-30 RX ADMIN — ACETAMINOPHEN PRN MG: 325 TABLET ORAL at 12:10

## 2021-06-30 RX ADMIN — DIVALPROEX SODIUM SCH MG: 250 TABLET, DELAYED RELEASE ORAL at 16:24

## 2021-06-30 RX ADMIN — Medication SCH EACH: at 07:20

## 2021-06-30 RX ADMIN — OLANZAPINE SCH MG: 10 TABLET ORAL at 21:18

## 2021-06-30 RX ADMIN — VITAMIN D, TAB 1000IU (100/BT) SCH UNIT: 25 TAB at 16:24

## 2021-06-30 RX ADMIN — INSULIN HUMAN PRN UNITS: 100 INJECTION, SOLUTION PARENTERAL at 17:19

## 2021-06-30 NOTE — NUR
RN NOTE- CONTINUES W RAMBLING DISORGANIZED SPEECH AND LABILITY PT PARANOID CONFUSED 
DIRECTABLE  UNINTELLIGIBLE, PO INTAKE GOOD MED COMPLIANT

## 2021-06-30 NOTE — NUR
RN NOTE- PT W GUM AND MOUTH PAIN FROM POOR DENTITION. TYLENOL INEFFECTIVE. CALLED NP 
ELIECER. MOTRIN 400 MG PO Q8H PRN ORDERED

## 2021-06-30 NOTE — NUR
RN NOTE- NOTED DURING MED PASS, ZYPREXA 5 MG QD ORDER WAS ACTUALLY GIVEN LAST NOC AT SAME 
TIME AS ZYPREXA 10 MG QHS DOSE. NOTIFIED DR FRY TO CLARIFY IF HE STILL WANTED QAM 
ZYPREXA DOSE GIVEN. DR FRY ORDERED IT TO BE GIVEN ANYWAY. PT ALERT ORIENTED CONFUSED. VS 
STABLE. AM ZYPREXA 5 MG DOSE ADMINISTERED.

## 2021-06-30 NOTE — NUR
GPS  CLOSING NOTES:

PATIENT IS SLEEPING COMFORTABLY IN BED.  NO S/S OF DISTRESS. RESPIRATION EVEN AND UNLABORED 
WITH EQUAL RISE AND FALL OF THE CHEST ON ROOM AIR. ALL PATIENT CARE NEEDS HAVE BEEN MET AS 
ANTICIPATED. WILL CONTINUE TO MONITOR FOR SAFETY, MOOD AND BEHAVIOR AND ENDORSE TO AM SHIFT.

## 2021-06-30 NOTE — NUR
GPS RN NOTES

RECEIVED LYING COMFORTABLY ON BED IN HIS ROOM,A/O X2-3,ABLE TO VERBALIZED NEEDS,CALM AND 
ABLE TO FOLLOW INSTRUCTIONS.MED COMPLIANT PER REPORT.NO ASHOK AT THE MOMENT.WILL CONTINUE TO 
MONITOR BEHAVIOR.

## 2021-07-01 VITALS — DIASTOLIC BLOOD PRESSURE: 70 MMHG | SYSTOLIC BLOOD PRESSURE: 129 MMHG

## 2021-07-01 VITALS — DIASTOLIC BLOOD PRESSURE: 82 MMHG | SYSTOLIC BLOOD PRESSURE: 156 MMHG

## 2021-07-01 VITALS — DIASTOLIC BLOOD PRESSURE: 67 MMHG | SYSTOLIC BLOOD PRESSURE: 133 MMHG

## 2021-07-01 RX ADMIN — VITAMIN D, TAB 1000IU (100/BT) SCH UNIT: 25 TAB at 08:19

## 2021-07-01 RX ADMIN — Medication SCH EACH: at 07:51

## 2021-07-01 RX ADMIN — VITAMIN D, TAB 1000IU (100/BT) SCH UNIT: 25 TAB at 16:31

## 2021-07-01 RX ADMIN — Medication SCH EACH: at 17:31

## 2021-07-01 RX ADMIN — Medication SCH EACH: at 21:52

## 2021-07-01 RX ADMIN — ASPIRIN 81 MG SCH MG: 81 TABLET ORAL at 08:19

## 2021-07-01 RX ADMIN — INSULIN HUMAN PRN UNITS: 100 INJECTION, SOLUTION PARENTERAL at 21:51

## 2021-07-01 RX ADMIN — DIVALPROEX SODIUM SCH MG: 250 TABLET, DELAYED RELEASE ORAL at 08:20

## 2021-07-01 RX ADMIN — MAGNESIUM OXIDE TAB 400 MG (241.3 MG ELEMENTAL MG) SCH MG: 400 (241.3 MG) TAB at 21:11

## 2021-07-01 RX ADMIN — VITAMIN D, TAB 1000IU (100/BT) SCH UNIT: 25 TAB at 12:39

## 2021-07-01 RX ADMIN — ACETAMINOPHEN PRN MG: 325 TABLET ORAL at 04:17

## 2021-07-01 RX ADMIN — OLANZAPINE SCH MG: 10 TABLET ORAL at 21:11

## 2021-07-01 RX ADMIN — INSULIN HUMAN PRN UNITS: 100 INJECTION, SOLUTION PARENTERAL at 08:35

## 2021-07-01 RX ADMIN — OXYCODONE HYDROCHLORIDE AND ACETAMINOPHEN SCH MG: 500 TABLET ORAL at 08:19

## 2021-07-01 RX ADMIN — OLANZAPINE SCH MG: 5 TABLET, FILM COATED ORAL at 08:19

## 2021-07-01 RX ADMIN — Medication SCH EACH: at 12:39

## 2021-07-01 RX ADMIN — INSULIN HUMAN PRN UNITS: 100 INJECTION, SOLUTION PARENTERAL at 12:40

## 2021-07-01 RX ADMIN — Medication SCH MG: at 08:19

## 2021-07-01 RX ADMIN — INSULIN HUMAN PRN UNITS: 100 INJECTION, SOLUTION PARENTERAL at 17:32

## 2021-07-01 RX ADMIN — DIVALPROEX SODIUM SCH MG: 250 TABLET, DELAYED RELEASE ORAL at 16:31

## 2021-07-01 RX ADMIN — OXYCODONE HYDROCHLORIDE AND ACETAMINOPHEN SCH MG: 500 TABLET ORAL at 16:31

## 2021-07-02 VITALS — SYSTOLIC BLOOD PRESSURE: 155 MMHG | DIASTOLIC BLOOD PRESSURE: 96 MMHG

## 2021-07-02 VITALS — SYSTOLIC BLOOD PRESSURE: 151 MMHG | DIASTOLIC BLOOD PRESSURE: 81 MMHG

## 2021-07-02 VITALS — DIASTOLIC BLOOD PRESSURE: 96 MMHG | SYSTOLIC BLOOD PRESSURE: 159 MMHG

## 2021-07-02 RX ADMIN — DIVALPROEX SODIUM SCH MG: 250 TABLET, DELAYED RELEASE ORAL at 08:45

## 2021-07-02 RX ADMIN — VITAMIN D, TAB 1000IU (100/BT) SCH UNIT: 25 TAB at 12:08

## 2021-07-02 RX ADMIN — TEMAZEPAM PRN MG: 7.5 CAPSULE ORAL at 23:52

## 2021-07-02 RX ADMIN — OLANZAPINE SCH MG: 10 TABLET ORAL at 21:22

## 2021-07-02 RX ADMIN — Medication SCH MG: at 08:45

## 2021-07-02 RX ADMIN — Medication SCH EACH: at 07:19

## 2021-07-02 RX ADMIN — INSULIN HUMAN PRN UNITS: 100 INJECTION, SOLUTION PARENTERAL at 17:22

## 2021-07-02 RX ADMIN — INSULIN HUMAN PRN UNITS: 100 INJECTION, SOLUTION PARENTERAL at 07:18

## 2021-07-02 RX ADMIN — DIVALPROEX SODIUM SCH MG: 250 TABLET, DELAYED RELEASE ORAL at 16:47

## 2021-07-02 RX ADMIN — OXYCODONE HYDROCHLORIDE AND ACETAMINOPHEN SCH MG: 500 TABLET ORAL at 08:45

## 2021-07-02 RX ADMIN — Medication SCH EACH: at 16:57

## 2021-07-02 RX ADMIN — INSULIN HUMAN PRN UNITS: 100 INJECTION, SOLUTION PARENTERAL at 21:25

## 2021-07-02 RX ADMIN — ACETAMINOPHEN PRN MG: 325 TABLET ORAL at 07:21

## 2021-07-02 RX ADMIN — MAGNESIUM OXIDE TAB 400 MG (241.3 MG ELEMENTAL MG) SCH MG: 400 (241.3 MG) TAB at 21:22

## 2021-07-02 RX ADMIN — Medication SCH EACH: at 21:23

## 2021-07-02 RX ADMIN — OXYCODONE HYDROCHLORIDE AND ACETAMINOPHEN SCH MG: 500 TABLET ORAL at 16:47

## 2021-07-02 RX ADMIN — VITAMIN D, TAB 1000IU (100/BT) SCH UNIT: 25 TAB at 16:47

## 2021-07-02 RX ADMIN — Medication SCH EACH: at 11:46

## 2021-07-02 RX ADMIN — VITAMIN D, TAB 1000IU (100/BT) SCH UNIT: 25 TAB at 08:46

## 2021-07-02 RX ADMIN — INSULIN HUMAN PRN UNITS: 100 INJECTION, SOLUTION PARENTERAL at 11:48

## 2021-07-02 RX ADMIN — OLANZAPINE SCH MG: 5 TABLET, FILM COATED ORAL at 08:46

## 2021-07-02 RX ADMIN — ASPIRIN 81 MG SCH MG: 81 TABLET ORAL at 08:45

## 2021-07-02 NOTE — NUR
RN NOTES: INSOMNIA

PT. C/O UNABLE TO SLEEP , RESTORIL 7.5 MG PO PRN GIVEN PER PT. REQUEST, WILL CONTINUE TO 
MONITOR.

## 2021-07-02 NOTE — NUR
RN-NOTES

PATIENT SITTING IN THE CHAIR NEXT TO HIS BED  AWAKE,ALERT CALM,NO ACUTE DISTRESS NOTED. 
ENDORSED TO THE RN NURSE FOR THE CONTINUITY OF CARE .

## 2021-07-02 NOTE — NUR
GPS RN NOTES:



PATIENT C/O GUM PAIN 3/10 ON PAIN SCALE, PRN TYLENOL 650MG PO GIVEN. WILL CONTINUE TO 
REASSESS.

## 2021-07-03 VITALS — DIASTOLIC BLOOD PRESSURE: 86 MMHG | SYSTOLIC BLOOD PRESSURE: 140 MMHG

## 2021-07-03 VITALS — DIASTOLIC BLOOD PRESSURE: 89 MMHG | SYSTOLIC BLOOD PRESSURE: 163 MMHG

## 2021-07-03 VITALS — SYSTOLIC BLOOD PRESSURE: 132 MMHG | DIASTOLIC BLOOD PRESSURE: 79 MMHG

## 2021-07-03 VITALS — SYSTOLIC BLOOD PRESSURE: 142 MMHG | DIASTOLIC BLOOD PRESSURE: 88 MMHG

## 2021-07-03 RX ADMIN — OXYCODONE HYDROCHLORIDE AND ACETAMINOPHEN SCH MG: 500 TABLET ORAL at 16:03

## 2021-07-03 RX ADMIN — Medication SCH EACH: at 07:33

## 2021-07-03 RX ADMIN — MAGNESIUM OXIDE TAB 400 MG (241.3 MG ELEMENTAL MG) SCH MG: 400 (241.3 MG) TAB at 21:10

## 2021-07-03 RX ADMIN — OLANZAPINE SCH MG: 5 TABLET, FILM COATED ORAL at 08:12

## 2021-07-03 RX ADMIN — Medication SCH EACH: at 12:34

## 2021-07-03 RX ADMIN — INSULIN HUMAN PRN UNITS: 100 INJECTION, SOLUTION PARENTERAL at 08:29

## 2021-07-03 RX ADMIN — Medication SCH EACH: at 21:50

## 2021-07-03 RX ADMIN — TEMAZEPAM PRN MG: 7.5 CAPSULE ORAL at 21:56

## 2021-07-03 RX ADMIN — OLANZAPINE SCH MG: 10 TABLET ORAL at 21:10

## 2021-07-03 RX ADMIN — DIVALPROEX SODIUM SCH MG: 250 TABLET, DELAYED RELEASE ORAL at 08:12

## 2021-07-03 RX ADMIN — INSULIN HUMAN PRN UNITS: 100 INJECTION, SOLUTION PARENTERAL at 21:49

## 2021-07-03 RX ADMIN — ASPIRIN 81 MG SCH MG: 81 TABLET ORAL at 08:12

## 2021-07-03 RX ADMIN — Medication SCH EACH: at 17:31

## 2021-07-03 RX ADMIN — INSULIN HUMAN PRN UNITS: 100 INJECTION, SOLUTION PARENTERAL at 17:35

## 2021-07-03 RX ADMIN — Medication SCH MG: at 08:12

## 2021-07-03 RX ADMIN — DIVALPROEX SODIUM SCH MG: 250 TABLET, DELAYED RELEASE ORAL at 16:03

## 2021-07-03 RX ADMIN — OXYCODONE HYDROCHLORIDE AND ACETAMINOPHEN SCH MG: 500 TABLET ORAL at 08:12

## 2021-07-03 RX ADMIN — INSULIN HUMAN PRN UNITS: 100 INJECTION, SOLUTION PARENTERAL at 11:23

## 2021-07-03 RX ADMIN — VITAMIN D, TAB 1000IU (100/BT) SCH UNIT: 25 TAB at 12:37

## 2021-07-03 RX ADMIN — VITAMIN D, TAB 1000IU (100/BT) SCH UNIT: 25 TAB at 08:12

## 2021-07-03 RX ADMIN — VITAMIN D, TAB 1000IU (100/BT) SCH UNIT: 25 TAB at 16:03

## 2021-07-03 NOTE — NUR
RN-NOTES

NILSA GONZÁLES  WAS MADE AWARE OF PATIENT BP /89, PULSE OF 75 WITH NNO. PATIENT IS LYING 
IN BED AWAKE,ALERT AND CALM,NO ACUTE DISTRESS NOTED,DENIES ANY PAIN OR DISCOMFORT AT THIS 
TIME.

## 2021-07-04 VITALS — DIASTOLIC BLOOD PRESSURE: 55 MMHG | SYSTOLIC BLOOD PRESSURE: 145 MMHG

## 2021-07-04 VITALS — DIASTOLIC BLOOD PRESSURE: 83 MMHG | SYSTOLIC BLOOD PRESSURE: 163 MMHG

## 2021-07-04 VITALS — SYSTOLIC BLOOD PRESSURE: 163 MMHG | DIASTOLIC BLOOD PRESSURE: 73 MMHG

## 2021-07-04 VITALS — SYSTOLIC BLOOD PRESSURE: 167 MMHG | DIASTOLIC BLOOD PRESSURE: 92 MMHG

## 2021-07-04 RX ADMIN — OXYCODONE HYDROCHLORIDE AND ACETAMINOPHEN SCH MG: 500 TABLET ORAL at 08:57

## 2021-07-04 RX ADMIN — OLANZAPINE SCH MG: 10 TABLET ORAL at 22:08

## 2021-07-04 RX ADMIN — TEMAZEPAM PRN MG: 7.5 CAPSULE ORAL at 22:08

## 2021-07-04 RX ADMIN — VITAMIN D, TAB 1000IU (100/BT) SCH UNIT: 25 TAB at 08:57

## 2021-07-04 RX ADMIN — INSULIN HUMAN PRN UNITS: 100 INJECTION, SOLUTION PARENTERAL at 16:37

## 2021-07-04 RX ADMIN — Medication SCH EACH: at 16:36

## 2021-07-04 RX ADMIN — MAGNESIUM OXIDE TAB 400 MG (241.3 MG ELEMENTAL MG) SCH MG: 400 (241.3 MG) TAB at 22:08

## 2021-07-04 RX ADMIN — ASPIRIN 81 MG SCH MG: 81 TABLET ORAL at 08:57

## 2021-07-04 RX ADMIN — VITAMIN D, TAB 1000IU (100/BT) SCH UNIT: 25 TAB at 12:11

## 2021-07-04 RX ADMIN — INSULIN HUMAN PRN UNITS: 100 INJECTION, SOLUTION PARENTERAL at 22:10

## 2021-07-04 RX ADMIN — DIVALPROEX SODIUM SCH MG: 250 TABLET, DELAYED RELEASE ORAL at 08:57

## 2021-07-04 RX ADMIN — Medication SCH EACH: at 11:13

## 2021-07-04 RX ADMIN — ACETAMINOPHEN PRN MG: 325 TABLET ORAL at 02:04

## 2021-07-04 RX ADMIN — DIVALPROEX SODIUM SCH MG: 250 TABLET, DELAYED RELEASE ORAL at 16:22

## 2021-07-04 RX ADMIN — Medication SCH MG: at 08:57

## 2021-07-04 RX ADMIN — OLANZAPINE SCH MG: 5 TABLET, FILM COATED ORAL at 08:57

## 2021-07-04 RX ADMIN — INSULIN HUMAN PRN UNITS: 100 INJECTION, SOLUTION PARENTERAL at 08:23

## 2021-07-04 RX ADMIN — Medication SCH EACH: at 22:09

## 2021-07-04 RX ADMIN — Medication SCH EACH: at 08:19

## 2021-07-04 RX ADMIN — VITAMIN D, TAB 1000IU (100/BT) SCH UNIT: 25 TAB at 16:23

## 2021-07-04 RX ADMIN — OXYCODONE HYDROCHLORIDE AND ACETAMINOPHEN SCH MG: 500 TABLET ORAL at 16:22

## 2021-07-04 RX ADMIN — INSULIN HUMAN PRN UNITS: 100 INJECTION, SOLUTION PARENTERAL at 11:13

## 2021-07-04 NOTE — NUR
GPS RN NOTE, PATIENT HAS A COMPLAINT OF A HEADACHE AT 3 OUT 10 ON THE PAIN SCALE AND IS 
REQUESTING TYLENOL AT THIS TIME.  PATIENT VITAL SIGNS ARE STABLE.  GAVE TYLENOL 650MG PO 
Q6HR PRN AS ORDERED.  WILL REASSESS PAIN AND I WILL CONTINUE TO MONITOR THIS PATIENT WITH 
THE HELP OF STAFF.

## 2021-07-05 VITALS — DIASTOLIC BLOOD PRESSURE: 72 MMHG | SYSTOLIC BLOOD PRESSURE: 128 MMHG

## 2021-07-05 VITALS — SYSTOLIC BLOOD PRESSURE: 131 MMHG | DIASTOLIC BLOOD PRESSURE: 89 MMHG

## 2021-07-05 LAB
BASOPHILS # BLD AUTO: 0 K/UL (ref 0–0.2)
BASOPHILS NFR BLD AUTO: 0.3 % (ref 0–2)
BUN SERPL-MCNC: 18 MG/DL (ref 7–18)
CALCIUM SERPL-MCNC: 9 MG/DL (ref 8.5–10.1)
CHLORIDE SERPL-SCNC: 103 MMOL/L (ref 98–107)
CO2 SERPL-SCNC: 30 MMOL/L (ref 21–32)
CREAT SERPL-MCNC: 0.9 MG/DL (ref 0.6–1.3)
EOSINOPHIL NFR BLD AUTO: 3.4 % (ref 0–6)
GLUCOSE SERPL-MCNC: 165 MG/DL (ref 74–106)
HCT VFR BLD AUTO: 45 % (ref 39–51)
HGB BLD-MCNC: 15.3 G/DL (ref 13.5–17.5)
LYMPHOCYTES NFR BLD AUTO: 2.3 K/UL (ref 0.8–4.8)
LYMPHOCYTES NFR BLD AUTO: 37 % (ref 20–44)
MCHC RBC AUTO-ENTMCNC: 34 G/DL (ref 31–36)
MCV RBC AUTO: 99 FL (ref 80–96)
MONOCYTES NFR BLD AUTO: 0.5 K/UL (ref 0.1–1.3)
MONOCYTES NFR BLD AUTO: 8.2 % (ref 2–12)
NEUTROPHILS # BLD AUTO: 3.1 K/UL (ref 1.8–8.9)
NEUTROPHILS NFR BLD AUTO: 51.1 % (ref 43–81)
PLATELET # BLD AUTO: 247 K/UL (ref 150–450)
POTASSIUM SERPL-SCNC: 4.4 MMOL/L (ref 3.5–5.1)
RBC # BLD AUTO: 4.55 MIL/UL (ref 4.5–6)
SODIUM SERPL-SCNC: 138 MMOL/L (ref 136–145)
WBC NRBC COR # BLD AUTO: 6.1 K/UL (ref 4.3–11)

## 2021-07-05 RX ADMIN — OLANZAPINE SCH MG: 5 TABLET, FILM COATED ORAL at 08:10

## 2021-07-05 RX ADMIN — Medication SCH EACH: at 21:39

## 2021-07-05 RX ADMIN — INSULIN HUMAN PRN UNITS: 100 INJECTION, SOLUTION PARENTERAL at 16:43

## 2021-07-05 RX ADMIN — OXYCODONE HYDROCHLORIDE AND ACETAMINOPHEN SCH MG: 500 TABLET ORAL at 08:10

## 2021-07-05 RX ADMIN — INSULIN HUMAN PRN UNITS: 100 INJECTION, SOLUTION PARENTERAL at 11:44

## 2021-07-05 RX ADMIN — OXYCODONE HYDROCHLORIDE AND ACETAMINOPHEN SCH MG: 500 TABLET ORAL at 16:44

## 2021-07-05 RX ADMIN — Medication SCH EACH: at 07:19

## 2021-07-05 RX ADMIN — Medication SCH MG: at 08:10

## 2021-07-05 RX ADMIN — Medication SCH EACH: at 16:40

## 2021-07-05 RX ADMIN — MAGNESIUM OXIDE TAB 400 MG (241.3 MG ELEMENTAL MG) SCH MG: 400 (241.3 MG) TAB at 21:39

## 2021-07-05 RX ADMIN — INSULIN HUMAN PRN UNITS: 100 INJECTION, SOLUTION PARENTERAL at 07:22

## 2021-07-05 RX ADMIN — VITAMIN D, TAB 1000IU (100/BT) SCH UNIT: 25 TAB at 12:02

## 2021-07-05 RX ADMIN — OLANZAPINE SCH MG: 10 TABLET ORAL at 21:39

## 2021-07-05 RX ADMIN — INSULIN HUMAN PRN UNITS: 100 INJECTION, SOLUTION PARENTERAL at 21:45

## 2021-07-05 RX ADMIN — DIVALPROEX SODIUM SCH MG: 250 TABLET, DELAYED RELEASE ORAL at 08:10

## 2021-07-05 RX ADMIN — AMLODIPINE BESYLATE SCH MG: 5 TABLET ORAL at 10:16

## 2021-07-05 RX ADMIN — ASPIRIN 81 MG SCH MG: 81 TABLET ORAL at 08:10

## 2021-07-05 RX ADMIN — Medication SCH EACH: at 11:43

## 2021-07-05 RX ADMIN — DIVALPROEX SODIUM SCH MG: 250 TABLET, DELAYED RELEASE ORAL at 16:44

## 2021-07-05 RX ADMIN — VITAMIN D, TAB 1000IU (100/BT) SCH UNIT: 25 TAB at 08:10

## 2021-07-05 RX ADMIN — VITAMIN D, TAB 1000IU (100/BT) SCH UNIT: 25 TAB at 16:44

## 2021-07-06 VITALS — DIASTOLIC BLOOD PRESSURE: 90 MMHG | SYSTOLIC BLOOD PRESSURE: 150 MMHG

## 2021-07-06 VITALS — SYSTOLIC BLOOD PRESSURE: 145 MMHG | DIASTOLIC BLOOD PRESSURE: 95 MMHG

## 2021-07-06 VITALS — SYSTOLIC BLOOD PRESSURE: 140 MMHG | DIASTOLIC BLOOD PRESSURE: 93 MMHG

## 2021-07-06 RX ADMIN — Medication SCH MG: at 08:47

## 2021-07-06 RX ADMIN — VITAMIN D, TAB 1000IU (100/BT) SCH UNIT: 25 TAB at 08:49

## 2021-07-06 RX ADMIN — VITAMIN D, TAB 1000IU (100/BT) SCH UNIT: 25 TAB at 12:10

## 2021-07-06 RX ADMIN — Medication SCH EACH: at 16:53

## 2021-07-06 RX ADMIN — OLANZAPINE SCH MG: 10 TABLET ORAL at 21:55

## 2021-07-06 RX ADMIN — OXYCODONE HYDROCHLORIDE AND ACETAMINOPHEN SCH MG: 500 TABLET ORAL at 08:47

## 2021-07-06 RX ADMIN — OXYCODONE HYDROCHLORIDE AND ACETAMINOPHEN SCH MG: 500 TABLET ORAL at 16:54

## 2021-07-06 RX ADMIN — VITAMIN D, TAB 1000IU (100/BT) SCH UNIT: 25 TAB at 16:54

## 2021-07-06 RX ADMIN — INSULIN HUMAN PRN UNITS: 100 INJECTION, SOLUTION PARENTERAL at 22:34

## 2021-07-06 RX ADMIN — DIVALPROEX SODIUM SCH MG: 250 TABLET, DELAYED RELEASE ORAL at 16:54

## 2021-07-06 RX ADMIN — OLANZAPINE SCH MG: 5 TABLET, FILM COATED ORAL at 08:47

## 2021-07-06 RX ADMIN — INSULIN HUMAN PRN UNITS: 100 INJECTION, SOLUTION PARENTERAL at 08:00

## 2021-07-06 RX ADMIN — Medication SCH EACH: at 12:04

## 2021-07-06 RX ADMIN — ASPIRIN 81 MG SCH MG: 81 TABLET ORAL at 08:47

## 2021-07-06 RX ADMIN — MAGNESIUM OXIDE TAB 400 MG (241.3 MG ELEMENTAL MG) SCH MG: 400 (241.3 MG) TAB at 21:55

## 2021-07-06 RX ADMIN — INSULIN HUMAN PRN UNITS: 100 INJECTION, SOLUTION PARENTERAL at 17:34

## 2021-07-06 RX ADMIN — Medication SCH EACH: at 22:36

## 2021-07-06 RX ADMIN — AMLODIPINE BESYLATE SCH MG: 5 TABLET ORAL at 08:49

## 2021-07-06 RX ADMIN — DIVALPROEX SODIUM SCH MG: 250 TABLET, DELAYED RELEASE ORAL at 08:47

## 2021-07-06 RX ADMIN — Medication SCH EACH: at 07:40

## 2021-07-06 RX ADMIN — ACETAMINOPHEN PRN MG: 325 TABLET ORAL at 01:37

## 2021-07-06 NOTE — NUR
Post 1 hr tylenol 650 mg po prn effective. NH 0/10. Pt is asleep easily aroused. Will 
continue to monitor.

## 2021-07-07 VITALS — DIASTOLIC BLOOD PRESSURE: 88 MMHG | SYSTOLIC BLOOD PRESSURE: 161 MMHG

## 2021-07-07 VITALS — DIASTOLIC BLOOD PRESSURE: 85 MMHG | SYSTOLIC BLOOD PRESSURE: 148 MMHG

## 2021-07-07 VITALS — DIASTOLIC BLOOD PRESSURE: 73 MMHG | SYSTOLIC BLOOD PRESSURE: 156 MMHG

## 2021-07-07 RX ADMIN — VITAMIN D, TAB 1000IU (100/BT) SCH UNIT: 25 TAB at 08:54

## 2021-07-07 RX ADMIN — OXYCODONE HYDROCHLORIDE AND ACETAMINOPHEN SCH MG: 500 TABLET ORAL at 08:54

## 2021-07-07 RX ADMIN — OLANZAPINE SCH MG: 5 TABLET, FILM COATED ORAL at 08:54

## 2021-07-07 RX ADMIN — VITAMIN D, TAB 1000IU (100/BT) SCH UNIT: 25 TAB at 12:13

## 2021-07-07 RX ADMIN — INSULIN HUMAN PRN UNITS: 100 INJECTION, SOLUTION PARENTERAL at 11:51

## 2021-07-07 RX ADMIN — Medication SCH EACH: at 07:32

## 2021-07-07 RX ADMIN — INSULIN HUMAN PRN UNITS: 100 INJECTION, SOLUTION PARENTERAL at 16:44

## 2021-07-07 RX ADMIN — DIVALPROEX SODIUM SCH MG: 250 TABLET, DELAYED RELEASE ORAL at 17:34

## 2021-07-07 RX ADMIN — OLANZAPINE SCH MG: 10 TABLET ORAL at 21:42

## 2021-07-07 RX ADMIN — ASPIRIN 81 MG SCH MG: 81 TABLET ORAL at 08:58

## 2021-07-07 RX ADMIN — INSULIN HUMAN PRN UNITS: 100 INJECTION, SOLUTION PARENTERAL at 07:34

## 2021-07-07 RX ADMIN — MAGNESIUM OXIDE TAB 400 MG (241.3 MG ELEMENTAL MG) SCH MG: 400 (241.3 MG) TAB at 21:42

## 2021-07-07 RX ADMIN — INSULIN HUMAN PRN UNITS: 100 INJECTION, SOLUTION PARENTERAL at 21:41

## 2021-07-07 RX ADMIN — DIVALPROEX SODIUM SCH MG: 250 TABLET, DELAYED RELEASE ORAL at 08:54

## 2021-07-07 RX ADMIN — Medication SCH EACH: at 21:41

## 2021-07-07 RX ADMIN — OXYCODONE HYDROCHLORIDE AND ACETAMINOPHEN SCH MG: 500 TABLET ORAL at 17:34

## 2021-07-07 RX ADMIN — Medication SCH MG: at 08:54

## 2021-07-07 RX ADMIN — Medication SCH EACH: at 11:58

## 2021-07-07 RX ADMIN — VITAMIN D, TAB 1000IU (100/BT) SCH UNIT: 25 TAB at 17:34

## 2021-07-07 RX ADMIN — Medication SCH EACH: at 16:42

## 2021-07-07 RX ADMIN — AMLODIPINE BESYLATE SCH MG: 5 TABLET ORAL at 08:54

## 2021-07-07 NOTE — NUR
GPS RN CLOSING NOTES:

PATIENT IS CURRENTLY SLEEPING. PATIENT SLEPT 7HRS THIS SHIFT. PATIENT WAS COMPLIANT WITH 
MEDICATION THIS SHIFT. NO S/S OF DISTRESS. RESPIRATION EVEN AND UNLABORED WITH EQUAL RISE 
AND FALL OF THE CHEST ON ROOM AIR. ALL PATIENT CARE NEEDS HAVE BEEN MET AS ANTICIPATED. BED 
IN LOWEST POSITION AND LOCKED, WITH SIDE RAILS UP X2 FOR SAFETY. WILL CONTINUE TO MONITOR 
FOR SAFETY, MOOD AND BEHAVIOR AND ENDORSE TO AM SHIFT.

## 2021-07-08 VITALS — SYSTOLIC BLOOD PRESSURE: 145 MMHG | DIASTOLIC BLOOD PRESSURE: 84 MMHG

## 2021-07-08 RX ADMIN — VITAMIN D, TAB 1000IU (100/BT) SCH UNIT: 25 TAB at 08:05

## 2021-07-08 RX ADMIN — Medication SCH EACH: at 07:15

## 2021-07-08 RX ADMIN — Medication SCH EACH: at 11:23

## 2021-07-08 RX ADMIN — OXYCODONE HYDROCHLORIDE AND ACETAMINOPHEN SCH MG: 500 TABLET ORAL at 08:05

## 2021-07-08 RX ADMIN — DIVALPROEX SODIUM SCH MG: 250 TABLET, DELAYED RELEASE ORAL at 08:05

## 2021-07-08 RX ADMIN — Medication SCH MG: at 08:05

## 2021-07-08 RX ADMIN — VITAMIN D, TAB 1000IU (100/BT) SCH UNIT: 25 TAB at 12:01

## 2021-07-08 RX ADMIN — ACETAMINOPHEN PRN MG: 325 TABLET ORAL at 05:46

## 2021-07-08 RX ADMIN — ASPIRIN 81 MG SCH MG: 81 TABLET ORAL at 08:05

## 2021-07-08 RX ADMIN — OLANZAPINE SCH MG: 5 TABLET, FILM COATED ORAL at 08:05

## 2021-07-08 RX ADMIN — INSULIN HUMAN PRN UNITS: 100 INJECTION, SOLUTION PARENTERAL at 07:43

## 2021-07-08 RX ADMIN — INSULIN HUMAN PRN UNITS: 100 INJECTION, SOLUTION PARENTERAL at 11:38

## 2021-07-08 NOTE — NUR
Pt. left the unit via ambulance and transported via a gurney. Left without distress and no 
agitation. V/S taken: /78, NY 97, RR 16, temp. 97.4 and oxygen sat 95%.

## 2021-07-08 NOTE — NUR
Discharge Note: Pt will be discharged to [facility] ([facility]) located at 
[address] [place] [address]; ([phone]). Pt will be transported via [facility] 
(Trip #[id number]) at 2PM. Pt does not have anyone for the SW to inform of the discharge. Upon 
discharge, pt appears to be in a dysphoric mood and presented with a labile affect. Pt 
denies both suicidal and homicidal ideation as well as auditory and visual hallucinations. 
Pt appears to be alert and oriented x3 (time, place, and self). Pt appears to be ambulatory 
with an unsteady gait. Pt appears to be disheveled yet appropriately dressed. Pt will 
continue to be under the care of psychiatrist, Dr. Tinajero, located at [address], 
[address] [place] [address]; [phone] and internist, Dr. Keller, located at 
[address] [place] [address]; [phone]. Pt was provided with substance 
abuse referrals and a copy was placed in the pts chart. The Choice of vendor form and the 
multidisciplinary exit care form was done, printed, signed, and given to the patient.

## 2021-07-08 NOTE — NUR
Dr. Benjamin covering for Dr. Tinajero gave an order to D/C hold and D/C to [facility]. Pt. to follow up with psych and medical doctors. and Dr. Benjamin gave an 
order to continue same meds including prn.

-------------------------------------------------------------------------------

Addendum: 07/08/21 at 0904 by ARAVIND LEZAMA RN

-------------------------------------------------------------------------------

Ines WEISS made aware of the discharge and to continue standing meds and no prn. 

-------------------------------------------------------------------------------

Addendum: 07/08/21 at 1401 by ARAVIND LEZAMA RN

-------------------------------------------------------------------------------

Ines WEISS ordered to continue accu check and the sliding scale.

## 2021-07-08 NOTE — NUR
Pt. is for discharge to [facility]. Pt. without distress, denies 
suicidal and homicidal. Belongings ready and pt. signed the discharge papers. Report given 
to Ricky THOMAS over the facility.

## 2021-07-08 NOTE — NUR
SNF Contact: CALE faxed updated notes to [facility] with attn to 
[place] to the fax number: [phone].

## 2021-11-15 NOTE — NUR
GPS/ PT WAS RECEIVED IN BED A/OX2, OBSERVED PT INTERACTING WITH PEERS. PT STILL NOTED WITH 
RAMBLING WORDS  WITH PRESSURED SPEECH. REALITY ORIENTATION DONE AND PT DENIED SI, HI OR 
INTENT TO HARM SELF OR OTHERS. NO NEW BEHAVIOR NOTED. CONTINUE MONITOR AS ORDER. Q/15 HEAD 
COUNT ONGOING. Negative Screen